# Patient Record
Sex: FEMALE | Race: WHITE | NOT HISPANIC OR LATINO | Employment: STUDENT | URBAN - NONMETROPOLITAN AREA
[De-identification: names, ages, dates, MRNs, and addresses within clinical notes are randomized per-mention and may not be internally consistent; named-entity substitution may affect disease eponyms.]

---

## 2022-12-22 ENCOUNTER — LAB REQUISITION (OUTPATIENT)
Dept: LAB | Facility: HOSPITAL | Age: 22
End: 2022-12-22

## 2022-12-22 DIAGNOSIS — F50.9 EATING DISORDER, UNSPECIFIED: ICD-10-CM

## 2022-12-22 LAB
BASOPHILS # BLD AUTO: 0 10*3/MM3 (ref 0–0.2)
BASOPHILS NFR BLD AUTO: 0 % (ref 0–1.5)
DEPRECATED RDW RBC AUTO: 45.1 FL (ref 37–54)
EOSINOPHIL # BLD AUTO: 0 10*3/MM3 (ref 0–0.4)
EOSINOPHIL NFR BLD AUTO: 0 % (ref 0.3–6.2)
ERYTHROCYTE [DISTWIDTH] IN BLOOD BY AUTOMATED COUNT: 16 % (ref 12.3–15.4)
HCT VFR BLD AUTO: 39.4 % (ref 34–46.6)
HGB BLD-MCNC: 12.8 G/DL (ref 12–15.9)
IMM GRANULOCYTES # BLD AUTO: 0.01 10*3/MM3 (ref 0–0.05)
IMM GRANULOCYTES NFR BLD AUTO: 0.1 % (ref 0–0.5)
LYMPHOCYTES # BLD AUTO: 1.83 10*3/MM3 (ref 0.7–3.1)
LYMPHOCYTES NFR BLD AUTO: 26.7 % (ref 19.6–45.3)
MCH RBC QN AUTO: 25.5 PG (ref 26.6–33)
MCHC RBC AUTO-ENTMCNC: 32.5 G/DL (ref 31.5–35.7)
MCV RBC AUTO: 78.5 FL (ref 79–97)
MONOCYTES # BLD AUTO: 0.44 10*3/MM3 (ref 0.1–0.9)
MONOCYTES NFR BLD AUTO: 6.4 % (ref 5–12)
NEUTROPHILS NFR BLD AUTO: 4.57 10*3/MM3 (ref 1.7–7)
NEUTROPHILS NFR BLD AUTO: 66.8 % (ref 42.7–76)
NRBC BLD AUTO-RTO: 0 /100 WBC (ref 0–0.2)
PLATELET # BLD AUTO: 326 10*3/MM3 (ref 140–450)
PMV BLD AUTO: 9.9 FL (ref 6–12)
RBC # BLD AUTO: 5.02 10*6/MM3 (ref 3.77–5.28)
WBC NRBC COR # BLD: 6.85 10*3/MM3 (ref 3.4–10.8)

## 2022-12-22 PROCEDURE — 83735 ASSAY OF MAGNESIUM: CPT | Performed by: PHYSICIAN ASSISTANT

## 2022-12-22 PROCEDURE — 84443 ASSAY THYROID STIM HORMONE: CPT | Performed by: PHYSICIAN ASSISTANT

## 2022-12-22 PROCEDURE — 80053 COMPREHEN METABOLIC PANEL: CPT | Performed by: PHYSICIAN ASSISTANT

## 2022-12-22 PROCEDURE — 84100 ASSAY OF PHOSPHORUS: CPT | Performed by: PHYSICIAN ASSISTANT

## 2022-12-22 PROCEDURE — 84436 ASSAY OF TOTAL THYROXINE: CPT | Performed by: PHYSICIAN ASSISTANT

## 2022-12-22 PROCEDURE — 80061 LIPID PANEL: CPT | Performed by: PHYSICIAN ASSISTANT

## 2022-12-22 PROCEDURE — 82607 VITAMIN B-12: CPT | Performed by: PHYSICIAN ASSISTANT

## 2022-12-22 PROCEDURE — 84703 CHORIONIC GONADOTROPIN ASSAY: CPT | Performed by: PHYSICIAN ASSISTANT

## 2022-12-22 PROCEDURE — 84425 ASSAY OF VITAMIN B-1: CPT | Performed by: PHYSICIAN ASSISTANT

## 2022-12-22 PROCEDURE — 82306 VITAMIN D 25 HYDROXY: CPT | Performed by: PHYSICIAN ASSISTANT

## 2022-12-22 PROCEDURE — 85025 COMPLETE CBC W/AUTO DIFF WBC: CPT | Performed by: PHYSICIAN ASSISTANT

## 2022-12-22 PROCEDURE — 83036 HEMOGLOBIN GLYCOSYLATED A1C: CPT | Performed by: PHYSICIAN ASSISTANT

## 2022-12-22 PROCEDURE — 84480 ASSAY TRIIODOTHYRONINE (T3): CPT | Performed by: PHYSICIAN ASSISTANT

## 2022-12-23 LAB
25(OH)D3 SERPL-MCNC: 33 NG/ML (ref 30–100)
ALBUMIN SERPL-MCNC: 4.8 G/DL (ref 3.5–5.2)
ALBUMIN/GLOB SERPL: 2.7 G/DL
ALP SERPL-CCNC: 105 U/L (ref 39–117)
ALT SERPL W P-5'-P-CCNC: 17 U/L (ref 1–33)
ANION GAP SERPL CALCULATED.3IONS-SCNC: 11.1 MMOL/L (ref 5–15)
AST SERPL-CCNC: 15 U/L (ref 1–32)
BILIRUB SERPL-MCNC: 0.2 MG/DL (ref 0–1.2)
BUN SERPL-MCNC: 9 MG/DL (ref 6–20)
BUN/CREAT SERPL: 11.8 (ref 7–25)
CALCIUM SPEC-SCNC: 9.1 MG/DL (ref 8.6–10.5)
CHLORIDE SERPL-SCNC: 104 MMOL/L (ref 98–107)
CHOLEST SERPL-MCNC: 176 MG/DL (ref 0–200)
CO2 SERPL-SCNC: 24.9 MMOL/L (ref 22–29)
CREAT SERPL-MCNC: 0.76 MG/DL (ref 0.57–1)
EGFRCR SERPLBLD CKD-EPI 2021: 114.5 ML/MIN/1.73
GLOBULIN UR ELPH-MCNC: 1.8 GM/DL
GLUCOSE SERPL-MCNC: 92 MG/DL (ref 65–99)
HBA1C MFR BLD: 5.1 % (ref 4.8–5.6)
HCG SERPL QL: NEGATIVE
HDLC SERPL-MCNC: 66 MG/DL (ref 40–60)
LDLC SERPL CALC-MCNC: 95 MG/DL (ref 0–100)
LDLC/HDLC SERPL: 1.43 {RATIO}
MAGNESIUM SERPL-MCNC: 2 MG/DL (ref 1.6–2.6)
PHOSPHATE SERPL-MCNC: 3.3 MG/DL (ref 2.5–4.5)
POTASSIUM SERPL-SCNC: 4.4 MMOL/L (ref 3.5–5.2)
PROT SERPL-MCNC: 6.6 G/DL (ref 6–8.5)
SODIUM SERPL-SCNC: 140 MMOL/L (ref 136–145)
T3 SERPL-MCNC: 148 NG/DL (ref 80–200)
T4 SERPL-MCNC: 7.57 MCG/DL (ref 4.5–11.7)
TRIGL SERPL-MCNC: 79 MG/DL (ref 0–150)
TSH SERPL DL<=0.05 MIU/L-ACNC: 2.24 UIU/ML (ref 0.27–4.2)
VIT B12 BLD-MCNC: 470 PG/ML (ref 211–946)
VLDLC SERPL-MCNC: 15 MG/DL (ref 5–40)

## 2022-12-27 LAB — VIT B1 BLD-SCNC: 115.8 NMOL/L (ref 66.5–200)

## 2022-12-29 ENCOUNTER — OFFICE VISIT (OUTPATIENT)
Dept: GASTROENTEROLOGY | Facility: CLINIC | Age: 22
End: 2022-12-29

## 2022-12-29 VITALS
WEIGHT: 209 LBS | HEART RATE: 97 BPM | DIASTOLIC BLOOD PRESSURE: 74 MMHG | HEIGHT: 64 IN | OXYGEN SATURATION: 98 % | BODY MASS INDEX: 35.68 KG/M2 | SYSTOLIC BLOOD PRESSURE: 118 MMHG

## 2022-12-29 DIAGNOSIS — K59.04 CHRONIC IDIOPATHIC CONSTIPATION: ICD-10-CM

## 2022-12-29 DIAGNOSIS — R05.3 CHRONIC COUGH: Primary | ICD-10-CM

## 2022-12-29 DIAGNOSIS — R09.89 GLOBUS SENSATION: ICD-10-CM

## 2022-12-29 DIAGNOSIS — R13.19 ESOPHAGEAL DYSPHAGIA: ICD-10-CM

## 2022-12-29 PROCEDURE — 99204 OFFICE O/P NEW MOD 45 MIN: CPT | Performed by: PHYSICIAN ASSISTANT

## 2022-12-29 RX ORDER — FERROUS SULFATE 325(65) MG
325 TABLET ORAL
COMMUNITY

## 2022-12-29 RX ORDER — ARIPIPRAZOLE 20 MG/1
20 TABLET ORAL DAILY
COMMUNITY
Start: 2022-11-15

## 2022-12-29 RX ORDER — PANTOPRAZOLE SODIUM 40 MG/1
40 TABLET, DELAYED RELEASE ORAL DAILY
COMMUNITY
Start: 2022-12-15

## 2022-12-29 RX ORDER — DESVENLAFAXINE 25 MG/1
25 TABLET, EXTENDED RELEASE ORAL DAILY
COMMUNITY
Start: 2022-11-15

## 2022-12-29 RX ORDER — LAMOTRIGINE 200 MG/1
400 TABLET ORAL DAILY
COMMUNITY
Start: 2022-11-15

## 2022-12-29 RX ORDER — SODIUM CHLORIDE 9 MG/ML
30 INJECTION, SOLUTION INTRAVENOUS CONTINUOUS PRN
Status: CANCELLED | OUTPATIENT
Start: 2022-12-29

## 2022-12-29 NOTE — PROGRESS NOTES
New Patient Consult      Date: 2022   Patient Name: Mable Mcdonough  MRN: [id number]  : 2000     Primary Care Provider: Valeria Laws APRN  Referring Provider: Veto    Chief Complaint   Patient presents with   • Difficulty Swallowing   • Cough     History of Present Illness: Mable Mcdonough is a 22 y.o. female who is here today as a consultation with Gastroenterology for evaluation of Difficulty Swallowing and Cough.    For more than the past 6 months, she has had a cough which occurs daily. She has not noticed it occurring more after eating certain foods but has noticed it is more at night. She has only noticed very minimal improvments in her cough since starting protonix 40 mg once daily for the past 2 weeks. She took PPI previously when cough started for approx 1 month but discontinued then because it did not seem to help. She has seen ENT and now following with pulmonology so far without any abnormal findings. She denies any heartburn symptoms, no acid into her mouth. Does have dysphagia with foods which is intermittent. Points to her neck as the area of the sensation. Food will eventually go down on it's own. No nausea or vomiting or regurgitation. Feels at times that something is stuck in her throat. She does not take any seasonal allergy medications. No history of severe allergies.     Does have history of anxiety, on medication for this currently and feels that it is controlled.     Has had constipation for as long as she can remember. Stools are typically hard, occur every 3-4 days. No rectal bleeding. No current abdominal pain. Has been taking iron daily recently. Had labs with PCP with low MCV but normal Hgb.     No previous EGD or colonoscopy. There is no known family history of colon cancer or colon polyps.    Subjective      Past Medical History:   Diagnosis Date   • Anemia    • Anxiety    • Bipolar 2 disorder (HCC)    • PTSD (post-traumatic stress disorder)      History  reviewed. No pertinent surgical history.     Family History   Problem Relation Age of Onset   • Hypothyroidism Father      Social History     Socioeconomic History   • Marital status: Single   Tobacco Use   • Smoking status: Never   Vaping Use   • Vaping Use: Never used   Substance and Sexual Activity   • Alcohol use: Never   • Drug use: Never   • Sexual activity: Defer     Current Outpatient Medications:   •  ARIPiprazole (ABILIFY) 20 MG tablet, Take 20 mg by mouth Daily., Disp: , Rfl:   •  Desvenlafaxine Succinate ER 25 MG tablet sustained-release 24 hour, Take 25 mg by mouth Daily., Disp: , Rfl:   •  ferrous sulfate 325 (65 FE) MG tablet, Take 325 mg by mouth Daily With Breakfast., Disp: , Rfl:   •  lamoTRIgine (LaMICtal) 200 MG tablet, Take 400 mg by mouth Daily., Disp: , Rfl:   •  pantoprazole (PROTONIX) 40 MG EC tablet, Take 40 mg by mouth Daily., Disp: , Rfl:     No Known Allergies    The following portions of the patient's history were reviewed and updated as appropriate: allergies, current medications, past family history, past medical history, past social history, past surgical history and problem list.    Objective     Physical Exam  Vitals reviewed.   Constitutional:       General: She is not in acute distress.     Appearance: Normal appearance. She is well-developed. She is obese. She is not ill-appearing or diaphoretic.   HENT:      Head: Normocephalic and atraumatic.      Right Ear: External ear normal.      Left Ear: External ear normal.      Nose: Nose normal.      Mouth/Throat:      Comments: Wearing a mask  Eyes:      General: No scleral icterus.        Right eye: No discharge.         Left eye: No discharge.      Conjunctiva/sclera: Conjunctivae normal.   Neck:      Vascular: No JVD.   Cardiovascular:      Rate and Rhythm: Normal rate and regular rhythm.      Heart sounds: Normal heart sounds. No murmur heard.    No friction rub. No gallop.   Pulmonary:      Effort: Pulmonary effort is normal. No  "respiratory distress.      Breath sounds: Normal breath sounds. No wheezing or rales.   Chest:      Chest wall: No tenderness.   Abdominal:      General: Bowel sounds are normal. There is no distension.      Palpations: Abdomen is soft. There is no mass.      Tenderness: There is abdominal tenderness (epigastric, mild). There is no guarding.   Musculoskeletal:         General: No deformity. Normal range of motion.      Cervical back: Normal range of motion.   Skin:     General: Skin is warm and dry.      Findings: No erythema or rash.   Neurological:      Mental Status: She is alert and oriented to person, place, and time.      Coordination: Coordination normal.   Psychiatric:         Mood and Affect: Mood normal.         Behavior: Behavior normal.         Thought Content: Thought content normal.         Judgment: Judgment normal.         Vitals:    12/29/22 1424   BP: 118/74   Pulse: 97   SpO2: 98%   Weight: 94.8 kg (209 lb)   Height: 162.6 cm (64\")     Results Review:   I have reviewed the patient's new clinical and imaging results.    Lab Requisition on 12/22/2022   Component Date Value Ref Range Status   • Vitamin B1, Whole Blood 12/22/2022 115.8  66.5 - 200.0 nmol/L Final   Lab Requisition on 12/22/2022   Component Date Value Ref Range Status   • Glucose 12/22/2022 92  65 - 99 mg/dL Final   • BUN 12/22/2022 9  6 - 20 mg/dL Final   • Creatinine 12/22/2022 0.76  0.57 - 1.00 mg/dL Final   • Sodium 12/22/2022 140  136 - 145 mmol/L Final   • Potassium 12/22/2022 4.4  3.5 - 5.2 mmol/L Final   • Chloride 12/22/2022 104  98 - 107 mmol/L Final   • CO2 12/22/2022 24.9  22.0 - 29.0 mmol/L Final   • Calcium 12/22/2022 9.1  8.6 - 10.5 mg/dL Final   • Total Protein 12/22/2022 6.6  6.0 - 8.5 g/dL Final   • Albumin 12/22/2022 4.80  3.50 - 5.20 g/dL Final   • ALT (SGPT) 12/22/2022 17  1 - 33 U/L Final   • AST (SGOT) 12/22/2022 15  1 - 32 U/L Final   • Alkaline Phosphatase 12/22/2022 105  39 - 117 U/L Final   • Total Bilirubin " 12/22/2022 0.2  0.0 - 1.2 mg/dL Final   • Globulin 12/22/2022 1.8  gm/dL Final   • A/G Ratio 12/22/2022 2.7  g/dL Final   • BUN/Creatinine Ratio 12/22/2022 11.8  7.0 - 25.0 Final   • Anion Gap 12/22/2022 11.1  5.0 - 15.0 mmol/L Final   • eGFR 12/22/2022 114.5  >60.0 mL/min/1.73 Final    National Kidney Foundation and American Society of Nephrology (ASN) Task Force recommended calculation based on the Chronic Kidney Disease Epidemiology Collaboration (CKD-EPI) equation refit without adjustment for race.   • Total Cholesterol 12/22/2022 176  0 - 200 mg/dL Final   • Triglycerides 12/22/2022 79  0 - 150 mg/dL Final   • HDL Cholesterol 12/22/2022 66 (H)  40 - 60 mg/dL Final   • LDL Cholesterol  12/22/2022 95  0 - 100 mg/dL Final   • VLDL Cholesterol 12/22/2022 15  5 - 40 mg/dL Final   • LDL/HDL Ratio 12/22/2022 1.43   Final   • Magnesium 12/22/2022 2.0  1.6 - 2.6 mg/dL Final   • Phosphorus 12/22/2022 3.3  2.5 - 4.5 mg/dL Final   • Hemoglobin A1C 12/22/2022 5.10  4.80 - 5.60 % Final   • HCG Qualitative 12/22/2022 Negative  Negative Final   • T4, Total 12/22/2022 7.57  4.50 - 11.70 mcg/dL Final   • TSH 12/22/2022 2.240  0.270 - 4.200 uIU/mL Final   • T3, Total 12/22/2022 148.0  80.0 - 200.0 ng/dl Final   • Vitamin B-12 12/22/2022 470  211 - 946 pg/mL Final   • 25 Hydroxy, Vitamin D 12/22/2022 33.0  30.0 - 100.0 ng/ml Final   • WBC 12/22/2022 6.85  3.40 - 10.80 10*3/mm3 Final   • RBC 12/22/2022 5.02  3.77 - 5.28 10*6/mm3 Final   • Hemoglobin 12/22/2022 12.8  12.0 - 15.9 g/dL Final   • Hematocrit 12/22/2022 39.4  34.0 - 46.6 % Final   • MCV 12/22/2022 78.5 (L)  79.0 - 97.0 fL Final   • MCH 12/22/2022 25.5 (L)  26.6 - 33.0 pg Final   • MCHC 12/22/2022 32.5  31.5 - 35.7 g/dL Final   • RDW 12/22/2022 16.0 (H)  12.3 - 15.4 % Final   • RDW-SD 12/22/2022 45.1  37.0 - 54.0 fl Final   • MPV 12/22/2022 9.9  6.0 - 12.0 fL Final   • Platelets 12/22/2022 326  140 - 450 10*3/mm3 Final      No radiology results for the last 90 days.      Assessment / Plan      1. Chronic cough  2. Esophageal dysphagia  3. Globus sensation  For >6 months now, she has had a daily cough, worse at night. Also having intermittent dysphagia with foods and globus sensation. Has underlying anxiety but reports this is controlled currently. Now on a daily PPi since the past 2 weeks and has only seen minimal improvement sin cough severity with this. Has already seen ENT and pulm and work up there has  Been negative. No prior EGD. It is possible that she has underlying reflux, need to rule out Eoe and esophageal stenosis. Suspect globus sensation related to anxiety, obesity and/or medication side effect (lamictal and Abilify).    Esophagram now  EGD will be arranged  Acid reflux measures discussed  Anti-reflux diet  Work on weight loss  Continue daily PPI for now    - [place] Esophagram Complete; Future    She will need an esophagogastroduodenoscopy with possible dilatation of the esophagus performed with monitored anesthesia care. The indications, technique, alternatives and potential risk and complications were discussed with the patient including but not limited to bleeding, bowel perforations, missing lesions and anesthetic complications. The patient understands and wishes to proceed with the procedure and has given their verbal consent. Written patient education information was given to the patient. She should follow up in the office after this procedure to discuss the results and further recommendations can be made at that time. The patient will call if they have further questions before procedure.  - Case Request    4. Chronic idiopathic constipation  She has had constipation most of her life. Having hard stools every 3-4 days. No prior colonoscopy. Is currently taking daily iron supplement as directed by her PCP but this has not made constipation worse.     Increase daily fiber intake  Increase daily water intake  Regular exercise  Start Miralax PRN constipation  If  symptoms persist, may consider colonoscopy in future          Follow Up:   Return for follow up after procedure to discuss results.      Gabbie Dexter PA-C  [facility] [place]  12/29/2022  16:06 EST    Dictated Utilizing Dragon Dictation: Part of this note may be an electronic transcription/translation of spoken language to printed text using the Dragon Dictation System.

## 2023-01-17 ENCOUNTER — HOSPITAL ENCOUNTER (OUTPATIENT)
Dept: GENERAL RADIOLOGY | Facility: HOSPITAL | Age: 23
Discharge: HOME | End: 2023-01-17
Admitting: PHYSICIAN ASSISTANT
Payer: COMMERCIAL

## 2023-01-17 DIAGNOSIS — R09.89 GLOBUS SENSATION: ICD-10-CM

## 2023-01-17 DIAGNOSIS — R05.3 CHRONIC COUGH: ICD-10-CM

## 2023-01-17 DIAGNOSIS — R13.19 ESOPHAGEAL DYSPHAGIA: ICD-10-CM

## 2023-01-17 PROCEDURE — 74220 X-RAY XM ESOPHAGUS 1CNTRST: CPT

## 2023-02-20 ENCOUNTER — TELEPHONE (OUTPATIENT)
Dept: GASTROENTEROLOGY | Facility: CLINIC | Age: 23
End: 2023-02-20
Payer: COMMERCIAL